# Patient Record
Sex: MALE | Race: WHITE | NOT HISPANIC OR LATINO | URBAN - NONMETROPOLITAN AREA
[De-identification: names, ages, dates, MRNs, and addresses within clinical notes are randomized per-mention and may not be internally consistent; named-entity substitution may affect disease eponyms.]

---

## 2023-01-05 ENCOUNTER — APPOINTMENT (OUTPATIENT)
Dept: GENERAL RADIOLOGY | Facility: HOSPITAL | Age: 58
End: 2023-01-05
Payer: OTHER GOVERNMENT

## 2023-01-05 ENCOUNTER — APPOINTMENT (OUTPATIENT)
Dept: CT IMAGING | Facility: HOSPITAL | Age: 58
End: 2023-01-05
Payer: OTHER GOVERNMENT

## 2023-01-05 ENCOUNTER — HOSPITAL ENCOUNTER (EMERGENCY)
Facility: HOSPITAL | Age: 58
Discharge: HOME | End: 2023-01-05
Attending: STUDENT IN AN ORGANIZED HEALTH CARE EDUCATION/TRAINING PROGRAM | Admitting: STUDENT IN AN ORGANIZED HEALTH CARE EDUCATION/TRAINING PROGRAM
Payer: OTHER GOVERNMENT

## 2023-01-05 VITALS
SYSTOLIC BLOOD PRESSURE: 143 MMHG | WEIGHT: 240 LBS | DIASTOLIC BLOOD PRESSURE: 95 MMHG | RESPIRATION RATE: 16 BRPM | HEIGHT: 75 IN | TEMPERATURE: 98.6 F | BODY MASS INDEX: 29.84 KG/M2 | OXYGEN SATURATION: 96 % | HEART RATE: 69 BPM

## 2023-01-05 DIAGNOSIS — R07.9 CHEST PAIN, UNSPECIFIED TYPE: Primary | ICD-10-CM

## 2023-01-05 LAB
ALBUMIN SERPL-MCNC: 4 G/DL (ref 3.5–5.2)
ALBUMIN/GLOB SERPL: 1.5 G/DL
ALP SERPL-CCNC: 84 U/L (ref 39–117)
ALT SERPL W P-5'-P-CCNC: 22 U/L (ref 1–41)
ANION GAP SERPL CALCULATED.3IONS-SCNC: 12 MMOL/L (ref 5–15)
AST SERPL-CCNC: 26 U/L (ref 1–40)
BASOPHILS # BLD AUTO: 0.11 10*3/MM3 (ref 0–0.2)
BASOPHILS NFR BLD AUTO: 1.4 % (ref 0–1.5)
BILIRUB SERPL-MCNC: 0.2 MG/DL (ref 0–1.2)
BUN SERPL-MCNC: 12 MG/DL (ref 6–20)
BUN/CREAT SERPL: 14.8 (ref 7–25)
CALCIUM SPEC-SCNC: 9 MG/DL (ref 8.6–10.5)
CHLORIDE SERPL-SCNC: 104 MMOL/L (ref 98–107)
CO2 SERPL-SCNC: 25 MMOL/L (ref 22–29)
CREAT SERPL-MCNC: 0.81 MG/DL (ref 0.76–1.27)
DEPRECATED RDW RBC AUTO: 41.4 FL (ref 37–54)
EGFRCR SERPLBLD CKD-EPI 2021: 103.5 ML/MIN/1.73
EOSINOPHIL # BLD AUTO: 0.37 10*3/MM3 (ref 0–0.4)
EOSINOPHIL NFR BLD AUTO: 4.7 % (ref 0.3–6.2)
ERYTHROCYTE [DISTWIDTH] IN BLOOD BY AUTOMATED COUNT: 12.6 % (ref 12.3–15.4)
GLOBULIN UR ELPH-MCNC: 2.7 GM/DL
GLUCOSE SERPL-MCNC: 109 MG/DL (ref 65–99)
HCT VFR BLD AUTO: 39.5 % (ref 37.5–51)
HGB BLD-MCNC: 12.9 G/DL (ref 13–17.7)
HOLD SPECIMEN: NORMAL
HOLD SPECIMEN: NORMAL
IMM GRANULOCYTES # BLD AUTO: 0.02 10*3/MM3 (ref 0–0.05)
IMM GRANULOCYTES NFR BLD AUTO: 0.3 % (ref 0–0.5)
LYMPHOCYTES # BLD AUTO: 2.03 10*3/MM3 (ref 0.7–3.1)
LYMPHOCYTES NFR BLD AUTO: 26 % (ref 19.6–45.3)
MCH RBC QN AUTO: 29.1 PG (ref 26.6–33)
MCHC RBC AUTO-ENTMCNC: 32.7 G/DL (ref 31.5–35.7)
MCV RBC AUTO: 89.2 FL (ref 79–97)
MONOCYTES # BLD AUTO: 0.68 10*3/MM3 (ref 0.1–0.9)
MONOCYTES NFR BLD AUTO: 8.7 % (ref 5–12)
NEUTROPHILS NFR BLD AUTO: 4.59 10*3/MM3 (ref 1.7–7)
NEUTROPHILS NFR BLD AUTO: 58.9 % (ref 42.7–76)
NRBC BLD AUTO-RTO: 0 /100 WBC (ref 0–0.2)
PLATELET # BLD AUTO: 296 10*3/MM3 (ref 140–450)
PMV BLD AUTO: 9.8 FL (ref 6–12)
POTASSIUM SERPL-SCNC: 3.5 MMOL/L (ref 3.5–5.2)
PROT SERPL-MCNC: 6.7 G/DL (ref 6–8.5)
QT INTERVAL: 406 MS
QTC INTERVAL: 462 MS
RBC # BLD AUTO: 4.43 10*6/MM3 (ref 4.14–5.8)
SODIUM SERPL-SCNC: 141 MMOL/L (ref 136–145)
TROPONIN T SERPL-MCNC: <0.01 NG/ML (ref 0–0.03)
TROPONIN T SERPL-MCNC: <0.01 NG/ML (ref 0–0.03)
WBC NRBC COR # BLD: 7.8 10*3/MM3 (ref 3.4–10.8)
WHOLE BLOOD HOLD COAG: NORMAL
WHOLE BLOOD HOLD SPECIMEN: NORMAL

## 2023-01-05 PROCEDURE — 70450 CT HEAD/BRAIN W/O DYE: CPT

## 2023-01-05 PROCEDURE — 99285 EMERGENCY DEPT VISIT HI MDM: CPT

## 2023-01-05 PROCEDURE — 93005 ELECTROCARDIOGRAM TRACING: CPT | Performed by: STUDENT IN AN ORGANIZED HEALTH CARE EDUCATION/TRAINING PROGRAM

## 2023-01-05 PROCEDURE — 84484 ASSAY OF TROPONIN QUANT: CPT | Performed by: STUDENT IN AN ORGANIZED HEALTH CARE EDUCATION/TRAINING PROGRAM

## 2023-01-05 PROCEDURE — 93010 ELECTROCARDIOGRAM REPORT: CPT | Performed by: SPECIALIST

## 2023-01-05 PROCEDURE — 71045 X-RAY EXAM CHEST 1 VIEW: CPT | Performed by: RADIOLOGY

## 2023-01-05 PROCEDURE — 85025 COMPLETE CBC W/AUTO DIFF WBC: CPT | Performed by: STUDENT IN AN ORGANIZED HEALTH CARE EDUCATION/TRAINING PROGRAM

## 2023-01-05 PROCEDURE — 71045 X-RAY EXAM CHEST 1 VIEW: CPT

## 2023-01-05 PROCEDURE — 36415 COLL VENOUS BLD VENIPUNCTURE: CPT

## 2023-01-05 PROCEDURE — 80053 COMPREHEN METABOLIC PANEL: CPT | Performed by: STUDENT IN AN ORGANIZED HEALTH CARE EDUCATION/TRAINING PROGRAM

## 2023-01-05 RX ORDER — ASPIRIN 81 MG/1
81 TABLET ORAL DAILY
Qty: 30 TABLET | Refills: 0 | Status: SHIPPED | OUTPATIENT
Start: 2023-01-05 | End: 2023-02-04

## 2023-01-05 RX ORDER — ASPIRIN 81 MG/1
324 TABLET, CHEWABLE ORAL ONCE
Status: COMPLETED | OUTPATIENT
Start: 2023-01-05 | End: 2023-01-05

## 2023-01-05 RX ORDER — SODIUM CHLORIDE 0.9 % (FLUSH) 0.9 %
10 SYRINGE (ML) INJECTION AS NEEDED
Status: DISCONTINUED | OUTPATIENT
Start: 2023-01-05 | End: 2023-01-05 | Stop reason: HOSPADM

## 2023-01-05 RX ADMIN — ASPIRIN 324 MG: 81 TABLET, CHEWABLE ORAL at 18:54

## 2023-01-05 NOTE — ED PROVIDER NOTES
Subjective   History of Present Illness  57-year-old male with past medical history of hypertension and anxiety presents to the ER with primary complaint of chest pain.  Patient noted left-sided chest pain.  Patient confirmed pain into both arms.  Patient confirmed back pain.  Patient confirmed nausea.  Patient confirmed cough.  Confirmed congestion.  Possible fever.  Patient also noted near syncope.  Denied obvious aggravating or alleviating factors.  No previous history of coronary artery disease.  Vitals stable        Review of Systems   Constitutional: Positive for fatigue.   Respiratory: Positive for cough and shortness of breath.    Cardiovascular: Positive for chest pain.   Neurological: Positive for dizziness.   All other systems reviewed and are negative.      Past Medical History:   Diagnosis Date   • Anxiety    • Erosive esophagitis    • Essential hypertension    • Low back pain        Allergies   Allergen Reactions   • Lisinopril Anaphylaxis       History reviewed. No pertinent surgical history.    History reviewed. No pertinent family history.    Social History     Socioeconomic History   • Marital status:            Objective   Physical Exam  Constitutional:       General: He is not in acute distress.     Appearance: He is well-developed. He is not ill-appearing.   HENT:      Head: Normocephalic and atraumatic.   Eyes:      Extraocular Movements: Extraocular movements intact.      Pupils: Pupils are equal, round, and reactive to light.   Neck:      Vascular: No JVD.   Cardiovascular:      Rate and Rhythm: Normal rate and regular rhythm.      Heart sounds: Normal heart sounds. No murmur heard.  Pulmonary:      Effort: No tachypnea, accessory muscle usage or respiratory distress.      Breath sounds: Normal breath sounds. No stridor. No decreased breath sounds, wheezing, rhonchi or rales.   Chest:      Chest wall: No deformity, tenderness or crepitus.   Abdominal:      General: Bowel sounds are  normal.      Palpations: Abdomen is soft.      Tenderness: There is no abdominal tenderness. There is no guarding or rebound.   Musculoskeletal:         General: Normal range of motion.      Cervical back: Normal range of motion and neck supple.      Right lower leg: No tenderness. No edema.      Left lower leg: No tenderness. No edema.   Lymphadenopathy:      Cervical: No cervical adenopathy.   Skin:     General: Skin is warm and dry.      Coloration: Skin is not cyanotic.      Findings: No ecchymosis or erythema.   Neurological:      General: No focal deficit present.      Mental Status: He is alert and oriented to person, place, and time.      Cranial Nerves: No cranial nerve deficit.      Motor: No weakness.   Psychiatric:         Mood and Affect: Mood normal. Mood is not anxious.         Behavior: Behavior normal. Behavior is not agitated.         Procedures           ED Course  ED Course as of 01/08/23 2053   Thu Jan 05, 2023   1659 EKG notes sinus rhythm.  78 bpm.  I directly evaluated the EKG of this patient and noted no acute abnormalities.    Electronically signed by Pedro Zamarripa DO, 01/05/23, 4:59 PM EST.   [SF]   2040 Patient care handed off to me pending CT head. CT head non actionable. Patient is informed to take asa daily and is provided a referral to Dr. Valdes for stress test and ECHO. Patient Heart score mild to moderate, appropriate for outpatient therapy. Patient discharged in stable condition with strict return precautions.  [LS]      ED Course User Index  [LS] Kristyn Alexander MD  [SF] Pedro Zamarripa DO                                           [facility]    Final diagnoses:   Chest pain, unspecified type       ED Disposition  ED Disposition     ED Disposition   Discharge    Condition   Stable    Comment   --             Ella Osuna, APRN  [address]  [facility] [address]  [phone]    Go in 2 days  Medical evaluation    William Valdes MD  [address] [place] [address]  [address]  [phone]    Go in 2 days  You will require stress test and ECHO please call to set up appointment         Medication List      New Prescriptions    aspirin 81 MG EC tablet  Take 1 tablet by mouth Daily for 30 days.           Where to Get Your Medications      These medications were sent to [facility]  ETHAN Perez - [address] - [phone]  - [phone]   [address], [facility] [address]    Phone: [phone]   · aspirin 81 MG EC tablet          Kristyn Alexander MD  01/08/23 2053

## 2023-01-06 NOTE — ED NOTES
Patient requesting water at this time. ED DO made aware and advises patient can have water,. Patient tolerated water well. NADN. VSS.  remains at bedside. All safety measures in place.

## 2023-01-06 NOTE — DISCHARGE INSTRUCTIONS
Please take aspirin daily and follow up with Cardiologist for stress test and ECHO. Return if chest pain worsen, difficulty breathing or any other concerns.

## 2023-02-16 ENCOUNTER — OFFICE VISIT (OUTPATIENT)
Dept: CARDIOLOGY | Facility: CLINIC | Age: 58
End: 2023-02-16
Payer: OTHER GOVERNMENT

## 2023-02-16 VITALS
HEIGHT: 75 IN | BODY MASS INDEX: 32.45 KG/M2 | SYSTOLIC BLOOD PRESSURE: 171 MMHG | OXYGEN SATURATION: 98 % | WEIGHT: 261 LBS | DIASTOLIC BLOOD PRESSURE: 101 MMHG | HEART RATE: 82 BPM

## 2023-02-16 DIAGNOSIS — R55 SYNCOPE AND COLLAPSE: ICD-10-CM

## 2023-02-16 DIAGNOSIS — R00.2 PALPITATIONS: ICD-10-CM

## 2023-02-16 DIAGNOSIS — F17.211 CIGARETTE NICOTINE DEPENDENCE IN REMISSION: ICD-10-CM

## 2023-02-16 DIAGNOSIS — R06.02 SHORTNESS OF BREATH: ICD-10-CM

## 2023-02-16 DIAGNOSIS — I10 ESSENTIAL HYPERTENSION: Primary | ICD-10-CM

## 2023-02-16 DIAGNOSIS — R07.2 PRECORDIAL CHEST PAIN: ICD-10-CM

## 2023-02-16 PROCEDURE — 93000 ELECTROCARDIOGRAM COMPLETE: CPT | Performed by: SPECIALIST

## 2023-02-16 PROCEDURE — 99204 OFFICE O/P NEW MOD 45 MIN: CPT | Performed by: SPECIALIST

## 2023-02-16 PROCEDURE — 93270 REMOTE 30 DAY ECG REV/REPORT: CPT | Performed by: SPECIALIST

## 2023-02-16 RX ORDER — HYDROCHLOROTHIAZIDE 12.5 MG/1
12.5 TABLET ORAL DAILY
COMMUNITY
End: 2023-02-16

## 2023-02-16 RX ORDER — HYDROCHLOROTHIAZIDE 25 MG/1
25 TABLET ORAL DAILY
Qty: 90 TABLET | Refills: 3 | Status: SHIPPED | OUTPATIENT
Start: 2023-02-16

## 2023-02-16 RX ORDER — AMLODIPINE BESYLATE 10 MG/1
10 TABLET ORAL DAILY
Qty: 90 TABLET | Refills: 1 | Status: SHIPPED | OUTPATIENT
Start: 2023-02-16

## 2023-02-16 RX ORDER — HYDROCHLOROTHIAZIDE 25 MG/1
25 TABLET ORAL DAILY
Qty: 90 TABLET | Refills: 3 | Status: SHIPPED | OUTPATIENT
Start: 2023-02-16 | End: 2023-02-16 | Stop reason: SDUPTHER

## 2023-02-16 RX ORDER — AMLODIPINE BESYLATE 10 MG/1
10 TABLET ORAL DAILY
Qty: 90 TABLET | Refills: 1 | Status: SHIPPED | OUTPATIENT
Start: 2023-02-16 | End: 2023-02-16 | Stop reason: SDUPTHER

## 2023-02-16 NOTE — PROGRESS NOTES
Subjective   Initial consultation, chest pain  Grey Betancourt is a 57 y.o. male who presents to day for Establish Care, Chest Pain, Shortness of Breath, and Palpitations (INTERMITTENT FLUTTERS).    CHIEF COMPLIANT  Chief Complaint   Patient presents with   • Establish Care   • Chest Pain   • Shortness of Breath   • Palpitations     INTERMITTENT FLUTTERS       Active Problems:  Problem List Items Addressed This Visit        Cardiac and Vasculature    Essential hypertension - Primary    Relevant Medications    amLODIPine (NORVASC) 10 MG tablet    hydroCHLOROthiazide (HYDRODIURIL) 25 MG tablet    Precordial chest pain    Relevant Orders    Stress Test With Myocardial Perfusion One Day    Adult Transthoracic Echo Complete w/ Color, Spectral and Contrast if necessary per protocol    Lipid Panel    Palpitations    Relevant Orders    Adult Transthoracic Echo Complete w/ Color, Spectral and Contrast if necessary per protocol    Cardiac Event Monitor       Pulmonary and Pneumonias    Shortness of breath    Relevant Orders    Adult Transthoracic Echo Complete w/ Color, Spectral and Contrast if necessary per protocol    Comprehensive Metabolic Panel       Symptoms and Signs    Syncope and collapse    Relevant Orders    Adult Transthoracic Echo Complete w/ Color, Spectral and Contrast if necessary per protocol       Tobacco    Cigarette nicotine dependence in remission       HPI  HPI  Patient has been having prolonged history of hypertension which has never been controlled as well as his chest pains relatively recently that being worse that he is having chest pain almost every day for prolonged period he will get pain he will feel that his head is also losing with a feeling of pounding in his ears couple of times he also passed out last one was about 3 months ago he was sitting the table when he passed out getting his heart sometimes fluttering a little bit and gets a sense that he wants to cough he himself smoked very briefly  when he was young.  And used long time ago not for many many years he has no history of diabetes not aware of any lipid problems has a strong family history of coronary artery disease with both the hip his parents has coronary arteries include had a heart attack when she was 50 and his brother also had heart attacks in his father at age 36 and heart  PRIOR MEDS  Current Outpatient Medications on File Prior to Visit   Medication Sig Dispense Refill   • LORATADINE-D 24HR  MG per 24 hr tablet      • Omeprazole 20 MG tablet delayed-release      • [DISCONTINUED] hydroCHLOROthiazide (HYDRODIURIL) 12.5 MG tablet Take 12.5 mg by mouth Daily.     • docusate sodium (COLACE) 100 MG capsule      • gabapentin (NEURONTIN) 800 MG tablet      • phentermine (ADIPEX-P) 37.5 MG tablet      • PROCTOZONE-HC 2.5 % rectal cream      • sucralfate (CARAFATE) 1 g tablet      • [DISCONTINUED] ibuprofen (ADVIL,MOTRIN) 400 MG tablet        No current facility-administered medications on file prior to visit.       ALLERGIES  Lisinopril    HISTORY  Past Medical History:   Diagnosis Date   • Anxiety    • Erosive esophagitis    • Essential hypertension    • Low back pain        Social History     Socioeconomic History   • Marital status:    Tobacco Use   • Smoking status: Never   • Smokeless tobacco: Never   Vaping Use   • Vaping Use: Never used   Substance and Sexual Activity   • Alcohol use: Not Currently   • Drug use: Yes     Types: Marijuana   • Sexual activity: Defer       Family History   Problem Relation Age of Onset   • Heart disease Mother    • Heart attack Mother    • Heart disease Father    • Heart attack Father    • Heart disease Brother    • Heart attack Brother        Review of Systems   Respiratory: Positive for chest tightness and shortness of breath. Negative for apnea, cough, choking, wheezing and stridor.    Cardiovascular: Positive for chest pain and palpitations. Negative for leg swelling.       Objective  "    VITALS: BP (!) 171/101   Pulse 82   Ht 190.5 cm (75\")   Wt 118 kg (261 lb)   SpO2 98%   BMI 32.62 kg/m²     LABS:   Lab Results (most recent)     None          IMAGING:   CT Head Without Contrast    Result Date: 1/5/2023  No acute intracranial abnormality. Signer Name: Jose Bird MD  Signed: 1/5/2023 7:45 PM  Workstation Name: [id number]  [facility]    XR Chest 1 View    Result Date: 1/5/2023  No radiographic evidence of acute cardiac or pulmonary disease.  This report was finalized on 1/5/2023 5:37 PM by Dr. Yong Luciano MD.        EXAM:  Physical Exam  Constitutional:       Appearance: He is well-developed.   HENT:      Head: Normocephalic and atraumatic.   Eyes:      Pupils: Pupils are equal, round, and reactive to light.   Neck:      Thyroid: No thyromegaly.      Vascular: No JVD.   Cardiovascular:      Rate and Rhythm: Normal rate and regular rhythm.      Chest Wall: PMI is not displaced.      Pulses: Normal pulses.      Heart sounds: Normal heart sounds, S1 normal and S2 normal. No murmur heard.    No friction rub. No gallop.   Pulmonary:      Effort: Pulmonary effort is normal. No respiratory distress.      Breath sounds: Normal breath sounds. No stridor. No wheezing or rales.   Chest:      Chest wall: No tenderness.   Abdominal:      General: Bowel sounds are normal. There is no distension.      Palpations: Abdomen is soft. There is no mass.      Tenderness: There is no abdominal tenderness. There is no guarding or rebound.   Musculoskeletal:      Cervical back: Neck supple. No edema.   Skin:     General: Skin is warm and dry.      Coloration: Skin is not pale.      Findings: No erythema or rash.   Neurological:      Mental Status: He is alert and oriented to person, place, and time.      Cranial Nerves: No cranial nerve deficit.      Coordination: Coordination normal.   Psychiatric:         Behavior: Behavior normal.         Procedure     ECG 12 Lead    Date/Time: 2/16/2023 " 10:11 AM  Performed by: Pietro Reza MD  Authorized by: Pietro Reza MD           EKG: Normal sinus rhythm with RSR pattern otherwise unremarkable EKG compared with EKG on 1/5/2023 no significant change       Assessment & Plan     Diagnoses and all orders for this visit:    1. Essential hypertension (Primary)  -     Discontinue: hydroCHLOROthiazide (HYDRODIURIL) 25 MG tablet; Take 1 tablet by mouth Daily.  Dispense: 90 tablet; Refill: 3  -     Discontinue: amLODIPine (NORVASC) 10 MG tablet; Take 1 tablet by mouth Daily.  Dispense: 90 tablet; Refill: 1  -     amLODIPine (NORVASC) 10 MG tablet; Take 1 tablet by mouth Daily.  Dispense: 90 tablet; Refill: 1  -     hydroCHLOROthiazide (HYDRODIURIL) 25 MG tablet; Take 1 tablet by mouth Daily.  Dispense: 90 tablet; Refill: 3    2. Precordial chest pain  -     Stress Test With Myocardial Perfusion One Day; Future  -     Adult Transthoracic Echo Complete w/ Color, Spectral and Contrast if necessary per protocol; Future  -     Lipid Panel; Future    3. Cigarette nicotine dependence in remission    4. Palpitations  -     Adult Transthoracic Echo Complete w/ Color, Spectral and Contrast if necessary per protocol; Future  -     Cardiac Event Monitor; Future    5. Shortness of breath  -     Adult Transthoracic Echo Complete w/ Color, Spectral and Contrast if necessary per protocol; Future  -     Comprehensive Metabolic Panel; Future    6. Syncope and collapse  -     Adult Transthoracic Echo Complete w/ Color, Spectral and Contrast if necessary per protocol; Future    Other orders  -     ECG 12 Lead    1.  Patient has typical atypical symptoms chest pain his visit in the ER showed negative troponins I suspect the chest pain is related to severe hypertension, but he also has multiple risk factors for coronary disease so going to proceed with treadmill nuclear stress testing for assessment of ischemia also get an echocardiogram to assess cardiac function wall motion and valve  morphology  2.  His blood pressure is very high it has been high for very long time we will going to increase the dose of the HCTZ HCTZ to 25 mg/day apparently he was having history of allergy to lisinopril with anaphylaxis so I will avoid any ACE inhibitor's at the time being and will be careful with ARB we will start him on amlodipine 10 mg/day  3.  I will repeat his potassium I will also going to get his lipid profile prior to his next visit  4.  He has palpitation he has a history of syncope also get an event monitor for assessment of arrhythmia  5.  He has remote history of brief tobacco abuse as a kid    Return After stress test.               MEDS ORDERED DURING VISIT:  New Medications Ordered This Visit   Medications   • amLODIPine (NORVASC) 10 MG tablet     Sig: Take 1 tablet by mouth Daily.     Dispense:  90 tablet     Refill:  1   • hydroCHLOROthiazide (HYDRODIURIL) 25 MG tablet     Sig: Take 1 tablet by mouth Daily.     Dispense:  90 tablet     Refill:  3       As always, Ella Osuna APRN  I appreciate very much the opportunity to participate in the cardiovascular care of your patients. Please do not hesitate to call me with any questions with regards to [facility] evaluation and management.         This document has been electronically signed by Pietro Reza MD  February 16, 2023 10:58 EST    This note is dictated utilizing voice recognition software.  Although this record has been proof read, transcriptional errors may still be present.

## 2023-02-27 ENCOUNTER — TELEPHONE (OUTPATIENT)
Dept: CARDIOLOGY | Facility: CLINIC | Age: 58
End: 2023-02-27
Payer: OTHER GOVERNMENT

## 2023-02-27 DIAGNOSIS — I48.0 AF (PAROXYSMAL ATRIAL FIBRILLATION): Primary | ICD-10-CM

## 2023-02-28 DIAGNOSIS — R00.2 PALPITATIONS: ICD-10-CM

## 2023-03-13 NOTE — TELEPHONE ENCOUNTER
Called pt no answer LM.     I am going to send a letter out to pt due to not be able to reach pt.

## 2023-03-27 ENCOUNTER — TREATMENT (OUTPATIENT)
Dept: CARDIOLOGY | Facility: CLINIC | Age: 58
End: 2023-03-27
Payer: OTHER GOVERNMENT

## 2023-03-27 DIAGNOSIS — R00.2 PALPITATIONS: Primary | ICD-10-CM

## 2023-03-27 PROCEDURE — 93272 ECG/REVIEW INTERPRET ONLY: CPT | Performed by: SPECIALIST

## 2023-04-28 ENCOUNTER — HOSPITAL ENCOUNTER (OUTPATIENT)
Dept: NUCLEAR MEDICINE | Facility: HOSPITAL | Age: 58
Discharge: HOME | End: 2023-04-28
Payer: OTHER GOVERNMENT

## 2023-04-28 ENCOUNTER — HOSPITAL ENCOUNTER (OUTPATIENT)
Dept: CARDIOLOGY | Facility: HOSPITAL | Age: 58
Discharge: HOME | End: 2023-04-28
Payer: OTHER GOVERNMENT

## 2023-04-28 DIAGNOSIS — R00.2 PALPITATIONS: ICD-10-CM

## 2023-04-28 DIAGNOSIS — R55 SYNCOPE AND COLLAPSE: ICD-10-CM

## 2023-04-28 DIAGNOSIS — R07.2 PRECORDIAL CHEST PAIN: ICD-10-CM

## 2023-04-28 DIAGNOSIS — R06.02 SHORTNESS OF BREATH: ICD-10-CM

## 2023-04-28 LAB
BH CV ECHO MEAS - ACS: 2.4 CM
BH CV ECHO MEAS - AO MAX PG: 6.3 MMHG
BH CV ECHO MEAS - AO MEAN PG: 3 MMHG
BH CV ECHO MEAS - AO ROOT DIAM: 4.1 CM
BH CV ECHO MEAS - AO V2 MAX: 125 CM/SEC
BH CV ECHO MEAS - AO V2 VTI: 25.4 CM
BH CV ECHO MEAS - EDV(CUBED): 158.8 ML
BH CV ECHO MEAS - EDV(MOD-SP4): 113 ML
BH CV ECHO MEAS - EF(MOD-SP4): 62 %
BH CV ECHO MEAS - ESV(CUBED): 54.7 ML
BH CV ECHO MEAS - ESV(MOD-SP4): 42.9 ML
BH CV ECHO MEAS - FS: 29.9 %
BH CV ECHO MEAS - IVS/LVPW: 1.16 CM
BH CV ECHO MEAS - IVSD: 1.24 CM
BH CV ECHO MEAS - LA DIMENSION: 4 CM
BH CV ECHO MEAS - LAT PEAK E' VEL: 10.4 CM/SEC
BH CV ECHO MEAS - LV DIASTOLIC VOL/BSA (35-75): 46 CM2
BH CV ECHO MEAS - LV MASS(C)D: 252.1 GRAMS
BH CV ECHO MEAS - LV SYSTOLIC VOL/BSA (12-30): 17.5 CM2
BH CV ECHO MEAS - LVIDD: 5.4 CM
BH CV ECHO MEAS - LVIDS: 3.8 CM
BH CV ECHO MEAS - LVOT AREA: 5.3 CM2
BH CV ECHO MEAS - LVOT DIAM: 2.6 CM
BH CV ECHO MEAS - LVPWD: 1.07 CM
BH CV ECHO MEAS - MED PEAK E' VEL: 8.5 CM/SEC
BH CV ECHO MEAS - MV A MAX VEL: 78 CM/SEC
BH CV ECHO MEAS - MV E MAX VEL: 63.8 CM/SEC
BH CV ECHO MEAS - MV E/A: 0.82
BH CV ECHO MEAS - PA ACC TIME: 0.12 SEC
BH CV ECHO MEAS - PA PR(ACCEL): 23.7 MMHG
BH CV ECHO MEAS - SI(MOD-SP4): 28.5 ML/M2
BH CV ECHO MEAS - SV(MOD-SP4): 70.1 ML
BH CV ECHO MEAS - TAPSE (>1.6): 3.1 CM
BH CV ECHO MEASUREMENTS AVERAGE E/E' RATIO: 6.75
BH CV NUCLEAR PRIOR STUDY: 3
BH CV REST NUCLEAR ISOTOPE DOSE: 9.5 MCI
BH CV STRESS BP STAGE 1: NORMAL
BH CV STRESS BP STAGE 2: NORMAL
BH CV STRESS BP STAGE 3: NORMAL
BH CV STRESS DURATION MIN STAGE 1: 3
BH CV STRESS DURATION MIN STAGE 2: 3
BH CV STRESS DURATION MIN STAGE 3: 3
BH CV STRESS DURATION MIN STAGE 4: 0
BH CV STRESS DURATION SEC STAGE 1: 0
BH CV STRESS DURATION SEC STAGE 2: 0
BH CV STRESS DURATION SEC STAGE 3: 0
BH CV STRESS DURATION SEC STAGE 4: 17
BH CV STRESS GRADE STAGE 1: 10
BH CV STRESS GRADE STAGE 2: 12
BH CV STRESS GRADE STAGE 3: 14
BH CV STRESS GRADE STAGE 4: 16
BH CV STRESS HR STAGE 1: 96
BH CV STRESS HR STAGE 2: 117
BH CV STRESS HR STAGE 3: 142
BH CV STRESS HR STAGE 4: 144
BH CV STRESS METS STAGE 1: 5
BH CV STRESS METS STAGE 2: 7.5
BH CV STRESS METS STAGE 3: 10
BH CV STRESS METS STAGE 4: 13.5
BH CV STRESS NUCLEAR ISOTOPE DOSE: 28.1 MCI
BH CV STRESS PROTOCOL 1: NORMAL
BH CV STRESS RECOVERY BP: NORMAL MMHG
BH CV STRESS RECOVERY HR: 95 BPM
BH CV STRESS SPEED STAGE 1: 1.7
BH CV STRESS SPEED STAGE 2: 2.5
BH CV STRESS SPEED STAGE 3: 3.4
BH CV STRESS SPEED STAGE 4: 4.2
BH CV STRESS STAGE 1: 1
BH CV STRESS STAGE 2: 2
BH CV STRESS STAGE 3: 3
BH CV STRESS STAGE 4: 4
LEFT ATRIUM VOLUME INDEX: 26.5 ML/M2
LV EF NUC BP: 60 %
MAXIMAL PREDICTED HEART RATE: 164 BPM
MAXIMAL PREDICTED HEART RATE: 164 BPM
PERCENT MAX PREDICTED HR: 87.8 %
STRESS BASELINE BP: NORMAL MMHG
STRESS BASELINE HR: 71 BPM
STRESS PERCENT HR: 103 %
STRESS POST ESTIMATED WORKLOAD: 10.9 METS
STRESS POST EXERCISE DUR MIN: 9 MIN
STRESS POST EXERCISE DUR SEC: 16 SEC
STRESS POST PEAK BP: NORMAL MMHG
STRESS POST PEAK HR: 144 BPM
STRESS TARGET HR: 139 BPM
STRESS TARGET HR: 139 BPM

## 2023-04-28 PROCEDURE — 93306 TTE W/DOPPLER COMPLETE: CPT

## 2023-04-28 PROCEDURE — 93017 CV STRESS TEST TRACING ONLY: CPT

## 2023-04-28 PROCEDURE — A9500 TC99M SESTAMIBI: HCPCS | Performed by: SPECIALIST

## 2023-04-28 PROCEDURE — 0 TECHNETIUM SESTAMIBI: Performed by: SPECIALIST

## 2023-04-28 PROCEDURE — 78452 HT MUSCLE IMAGE SPECT MULT: CPT

## 2023-04-28 PROCEDURE — 93306 TTE W/DOPPLER COMPLETE: CPT | Performed by: SPECIALIST

## 2023-04-28 RX ADMIN — TECHNETIUM TC 99M SESTAMIBI 1 DOSE: 1 INJECTION INTRAVENOUS at 09:38

## 2023-04-28 RX ADMIN — TECHNETIUM TC 99M SESTAMIBI 1 DOSE: 1 INJECTION INTRAVENOUS at 11:02

## 2023-05-15 ENCOUNTER — LAB (OUTPATIENT)
Dept: LAB | Facility: HOSPITAL | Age: 58
End: 2023-05-15
Payer: MEDICAID

## 2023-05-15 DIAGNOSIS — R07.2 PRECORDIAL CHEST PAIN: ICD-10-CM

## 2023-05-15 DIAGNOSIS — R06.02 SHORTNESS OF BREATH: ICD-10-CM

## 2023-05-15 PROCEDURE — 80061 LIPID PANEL: CPT

## 2023-05-15 PROCEDURE — 36415 COLL VENOUS BLD VENIPUNCTURE: CPT

## 2023-05-15 PROCEDURE — 80053 COMPREHEN METABOLIC PANEL: CPT

## 2023-05-16 LAB
ALBUMIN SERPL-MCNC: 4.2 G/DL (ref 3.5–5.2)
ALBUMIN/GLOB SERPL: 1.5 G/DL
ALP SERPL-CCNC: 78 U/L (ref 39–117)
ALT SERPL W P-5'-P-CCNC: 23 U/L (ref 1–41)
ANION GAP SERPL CALCULATED.3IONS-SCNC: 9 MMOL/L (ref 5–15)
AST SERPL-CCNC: 26 U/L (ref 1–40)
BILIRUB SERPL-MCNC: 0.7 MG/DL (ref 0–1.2)
BUN SERPL-MCNC: 11 MG/DL (ref 6–20)
BUN/CREAT SERPL: 14.1 (ref 7–25)
CALCIUM SPEC-SCNC: 8.7 MG/DL (ref 8.6–10.5)
CHLORIDE SERPL-SCNC: 105 MMOL/L (ref 98–107)
CHOLEST SERPL-MCNC: 173 MG/DL (ref 0–200)
CO2 SERPL-SCNC: 24 MMOL/L (ref 22–29)
CREAT SERPL-MCNC: 0.78 MG/DL (ref 0.76–1.27)
EGFRCR SERPLBLD CKD-EPI 2021: 104.7 ML/MIN/1.73
GLOBULIN UR ELPH-MCNC: 2.8 GM/DL
GLUCOSE SERPL-MCNC: 87 MG/DL (ref 65–99)
HDLC SERPL-MCNC: 52 MG/DL (ref 40–60)
LDLC SERPL CALC-MCNC: 107 MG/DL (ref 0–100)
LDLC/HDLC SERPL: 2.05 {RATIO}
POTASSIUM SERPL-SCNC: 4 MMOL/L (ref 3.5–5.2)
PROT SERPL-MCNC: 7 G/DL (ref 6–8.5)
SODIUM SERPL-SCNC: 138 MMOL/L (ref 136–145)
TRIGL SERPL-MCNC: 72 MG/DL (ref 0–150)
VLDLC SERPL-MCNC: 14 MG/DL (ref 5–40)

## 2023-06-15 ENCOUNTER — OFFICE VISIT (OUTPATIENT)
Dept: CARDIOLOGY | Facility: CLINIC | Age: 58
End: 2023-06-15
Payer: MEDICAID

## 2023-06-15 VITALS
HEART RATE: 86 BPM | RESPIRATION RATE: 18 BRPM | HEIGHT: 75 IN | BODY MASS INDEX: 33.74 KG/M2 | WEIGHT: 271.4 LBS | OXYGEN SATURATION: 98 % | SYSTOLIC BLOOD PRESSURE: 129 MMHG | DIASTOLIC BLOOD PRESSURE: 84 MMHG

## 2023-06-15 DIAGNOSIS — I10 ESSENTIAL HYPERTENSION: Primary | ICD-10-CM

## 2023-06-15 DIAGNOSIS — I48.0 AF (PAROXYSMAL ATRIAL FIBRILLATION): ICD-10-CM

## 2023-06-15 DIAGNOSIS — I47.1 ATRIAL TACHYCARDIA: ICD-10-CM

## 2023-06-15 DIAGNOSIS — R07.2 PRECORDIAL CHEST PAIN: ICD-10-CM

## 2023-06-15 DIAGNOSIS — G47.33 OBSTRUCTIVE SLEEP APNEA: ICD-10-CM

## 2023-06-15 PROBLEM — I47.19 ATRIAL TACHYCARDIA: Status: ACTIVE | Noted: 2023-06-15

## 2023-06-15 PROCEDURE — 3079F DIAST BP 80-89 MM HG: CPT | Performed by: NURSE PRACTITIONER

## 2023-06-15 PROCEDURE — 1159F MED LIST DOCD IN RCRD: CPT | Performed by: NURSE PRACTITIONER

## 2023-06-15 PROCEDURE — 1160F RVW MEDS BY RX/DR IN RCRD: CPT | Performed by: NURSE PRACTITIONER

## 2023-06-15 PROCEDURE — 3074F SYST BP LT 130 MM HG: CPT | Performed by: NURSE PRACTITIONER

## 2023-06-15 PROCEDURE — 99214 OFFICE O/P EST MOD 30 MIN: CPT | Performed by: NURSE PRACTITIONER

## 2023-06-15 RX ORDER — RANOLAZINE 500 MG/1
500 TABLET, EXTENDED RELEASE ORAL 2 TIMES DAILY
Qty: 60 TABLET | Refills: 1 | Status: SHIPPED | OUTPATIENT
Start: 2023-06-15

## 2023-06-15 RX ORDER — OMEPRAZOLE 20 MG/1
20 TABLET, DELAYED RELEASE ORAL
COMMUNITY
Start: 2023-05-31

## 2023-06-15 RX ORDER — ASPIRIN 81 MG/1
81 TABLET, FILM COATED ORAL DAILY
COMMUNITY
Start: 2023-05-31

## 2023-06-15 NOTE — PROGRESS NOTES
[facility]             Tamara NOA Harris Louisa Elizabeth, APRN  Grey Hindman  1965  06/15/2023    Patient Active Problem List   Diagnosis    Essential hypertension    Precordial chest pain    Cigarette nicotine dependence in remission    Palpitations    Shortness of breath    Syncope and collapse    Atrial tachycardia       Dear Ella Osuna APRN:    Subjective     Chief Complaint   Patient presents with    Follow-up     4 month    Results     Stress, echo and monitor    Chest Pain     Not active    Dizziness    Fatigue    Shortness of Breath       HPI:     This is a 57 y.o. male with known past medical history of essential hypertension and tobacco abuse.    Grey Betancourt presents today for routine cardiology follow up.  Patient states since his last visit he continues to have chest pain intermittently that he describes as a sharp chest pain in the left side of his chest that sometimes radiates to his arm.  Symptoms it is constant in nature and sometimes it comes and goes.  Reports intermittent palpitations with dizziness and fatigue.  Echocardiogram showed EF of 61 to 65%.  Nuclear stress testing showed very mild mostly reverse partially fixed basal inferior, extending to mid inferior wall at the rest images not involving the wall thickness with normal wall motion consistent with diaphragmatic attenuation with no ischemia seen.  Cardiac event monitor showed normal sinus rhythm with several runs of atrial tachycardia with occasional runs of atrial fibrillation with RVR.  Does have a significant family history of premature coronary artery disease in his mother, dad and brother who all  of MI at a early age.    Diagnostic Testing  Echocardiogram 2023: EF 61 to 65%  Nuclear stress test 2023: Very mild mostly reversed partly fixed basal inferior, extending to mid inferior wall at the rest images not involving the wall thickness with  normal wall motion, the defect is consistent with diaphragmatic attenuation artifact, no ischemia seen, TID 0.89.   Cardiac event monitor 2/2023: Predominant normal sinus rhythm with several runs of atrial tachycardia with occasional runs of atrial fibrillation with RVR       All other systems were reviewed and were negative.    Patient Active Problem List   Diagnosis    Essential hypertension    Precordial chest pain    Cigarette nicotine dependence in remission    Palpitations    Shortness of breath    Syncope and collapse    Atrial tachycardia       family history includes Heart attack in his brother, father, and mother; Heart disease in his brother, father, and mother.     reports that he has never smoked. He has never used smokeless tobacco. He reports that he does not currently use alcohol. He reports current drug use. Drug: Marijuana.    Allergies   Allergen Reactions    Lisinopril Anaphylaxis         Current Outpatient Medications:     Adult Aspirin Regimen 81 MG EC tablet, Take 1 tablet by mouth Daily., Disp: , Rfl:     amLODIPine (NORVASC) 10 MG tablet, Take 1 tablet by mouth Daily. (Patient taking differently: Take 5 mg by mouth Daily.), Disp: 90 tablet, Rfl: 1    hydroCHLOROthiazide (HYDRODIURIL) 25 MG tablet, Take 1 tablet by mouth Daily., Disp: 90 tablet, Rfl: 3    LORATADINE-D 24HR  MG per 24 hr tablet, , Disp: , Rfl:     Omeprazole 20 MG tablet delayed-release, , Disp: , Rfl:     omeprazole OTC (PriLOSEC OTC) 20 MG EC tablet, 1 tablet., Disp: , Rfl:     metoprolol tartrate (LOPRESSOR) 25 MG tablet, Take 1 tablet by mouth 2 (Two) Times a Day., Disp: 60 tablet, Rfl: 3    ranolazine (Ranexa) 500 MG 12 hr tablet, Take 1 tablet by mouth 2 (Two) Times a Day., Disp: 60 tablet, Rfl: 1      Physical Exam:  I have reviewed the patient's current vital signs as documented in the patient's EMR.   Vitals:    06/15/23 1352   BP: 129/84   Pulse: 86   Resp: 18   SpO2: 98%     Body mass index is 33.92 kg/m².        06/15/23  1352   Weight: 123 kg (271 lb 6.4 oz)      Physical Exam  Constitutional:       General: He is not in acute distress.     Appearance: Normal appearance. He is well-developed.   HENT:      Head: Normocephalic and atraumatic.      Mouth/Throat:      Mouth: Mucous membranes are moist.   Eyes:      Extraocular Movements: Extraocular movements intact.      Pupils: Pupils are equal, round, and reactive to light.   Neck:      Vascular: No JVD.   Cardiovascular:      Rate and Rhythm: Normal rate and regular rhythm.      Heart sounds: Normal heart sounds. No murmur heard.    No S3 or S4 sounds.   Pulmonary:      Effort: Pulmonary effort is normal. No respiratory distress.      Breath sounds: Normal breath sounds. No wheezing.   Abdominal:      General: Bowel sounds are normal. There is no distension.      Palpations: Abdomen is soft. There is no hepatomegaly.      Tenderness: There is no abdominal tenderness.   Musculoskeletal:         General: Normal range of motion.      Cervical back: Normal range of motion and neck supple.   Skin:     General: Skin is warm and dry.      Coloration: Skin is not jaundiced or pale.   Neurological:      General: No focal deficit present.      Mental Status: He is alert and oriented to person, place, and time. Mental status is at baseline.   Psychiatric:         Mood and Affect: Mood normal.         Behavior: Behavior normal.         Thought Content: Thought content normal.         Judgment: Judgment normal.          DATA REVIEWED:     TTE/AFIA:  Results for orders placed during the hospital encounter of 04/28/23    Adult Transthoracic Echo Complete w/ Color, Spectral and Contrast if necessary per protocol    Interpretation Summary    Left ventricular systolic function is normal. Left ventricular ejection fraction appears to be 61 - 65%.    Left ventricular wall thickness is consistent with borderline concentric hypertrophy.    Left ventricular diastolic function is consistent  with (grade I) impaired relaxation.    Mild dilation of the aortic root is present.      Laboratory evaluations:    Lab Results   Component Value Date    GLUCOSE 87 05/15/2023    BUN 11 05/15/2023    CREATININE 0.78 05/15/2023    BCR 14.1 05/15/2023    K 4.0 05/15/2023    CO2 24.0 05/15/2023    CALCIUM 8.7 05/15/2023    ALBUMIN 4.2 05/15/2023    AST 26 05/15/2023    ALT 23 05/15/2023     Lab Results   Component Value Date    WBC 7.80 01/05/2023    HGB 12.9 (L) 01/05/2023    HCT 39.5 01/05/2023    MCV 89.2 01/05/2023     01/05/2023     Lab Results   Component Value Date    CHOL 173 05/15/2023    TRIG 72 05/15/2023    HDL 52 05/15/2023     (H) 05/15/2023     No results found for: TSH, Y6WVRFF, N5YZKXL, THYROIDAB  No results found for: HGBA1C  Lab Results   Component Value Date    ALT 23 05/15/2023     No results found for: HGBA1C  Lab Results   Component Value Date    CREATININE 0.78 05/15/2023     No results found for: IRON, TIBC, FERRITIN  No results found for: INR, PROTIME        --------------------------------------------------------------------------------------------------------------------------    ASSESSMENT/PLAN:      Diagnosis Plan   1. Essential hypertension  Hemoglobin A1c    metoprolol tartrate (LOPRESSOR) 25 MG tablet      2. AF (paroxysmal atrial fibrillation)  metoprolol tartrate (LOPRESSOR) 25 MG tablet      3. Obstructive sleep apnea  Home Sleep Study      4. Precordial chest pain  CT Angiogram Coronary    ranolazine (Ranexa) 500 MG 12 hr tablet      5. Atrial tachycardia            Atrial fibrillation  Atrial tachycardia  Patient had cardiac event monitor which showed runs of atrial tachycardia and atrial fibrillation with RVR.  Patient with intermittent palpitations.  We did discuss atrial fibrillation in detail.  Chads Vascor of 1 for hypertension.  Check hemoglobin A1c to rule out diabetes.  For now given low [id number] or he would not require anticoagulation however did discuss  with him that if any comorbidities were to change he might require anticoagulation in the long-term.  Check home sleep study to rule out sleep apnea as cause of arrhythmias.  Add metoprolol 25 mg p.o. twice daily for arrhythmias.    Chest pain  Continues to have chest pain.  Significant history of premature coronary artery disease in his mother, father and brother.  Nuclear stress test showed possible breast attenuation artifact with no ischemia.  Did discuss with patient about obtaining CT coronary angiogram [facility] symptom versus calcium score at [facility].  Patient does have transportation issues therefore for now he states he will try to do CT coronary angiogram about itself [place] but if he is unable to find a ride he will need to do a cardiac calcium score.    Add Ranexa for chest pain.    4.  Essential hypertension   Blood pressure well controlled.  Recent CMP showed stable kidney function.  Continue with amlodipine and HCTZ.      This document has been @Electronically signed by NOA Thibodeaux, 06/15/23, 12:59 PM EDT.       Dictated Utilizing Dragon Dictation: Part of this note may be an electronic transcription/translation of spoken language to printed text using the Dragon Dictation System.    Follow-up appointment and medication changes provided in hand delivered After Visit Summary as well as reviewed in the room.      yes

## 2023-08-10 ENCOUNTER — OFFICE VISIT (OUTPATIENT)
Dept: CARDIOLOGY | Facility: CLINIC | Age: 58
End: 2023-08-10
Payer: MEDICAID

## 2023-08-10 VITALS
BODY MASS INDEX: 33.45 KG/M2 | RESPIRATION RATE: 18 BRPM | HEART RATE: 74 BPM | WEIGHT: 269 LBS | SYSTOLIC BLOOD PRESSURE: 132 MMHG | DIASTOLIC BLOOD PRESSURE: 94 MMHG | HEIGHT: 75 IN | OXYGEN SATURATION: 97 %

## 2023-08-10 DIAGNOSIS — R07.2 PRECORDIAL CHEST PAIN: ICD-10-CM

## 2023-08-10 DIAGNOSIS — I47.1 ATRIAL TACHYCARDIA: ICD-10-CM

## 2023-08-10 DIAGNOSIS — I10 ESSENTIAL HYPERTENSION: ICD-10-CM

## 2023-08-10 DIAGNOSIS — I48.0 PAROXYSMAL ATRIAL FIBRILLATION: ICD-10-CM

## 2023-08-10 DIAGNOSIS — R00.2 PALPITATIONS: Primary | ICD-10-CM

## 2023-08-10 PROCEDURE — 99214 OFFICE O/P EST MOD 30 MIN: CPT | Performed by: NURSE PRACTITIONER

## 2023-08-10 PROCEDURE — 3080F DIAST BP >= 90 MM HG: CPT | Performed by: NURSE PRACTITIONER

## 2023-08-10 PROCEDURE — 1159F MED LIST DOCD IN RCRD: CPT | Performed by: NURSE PRACTITIONER

## 2023-08-10 PROCEDURE — 1160F RVW MEDS BY RX/DR IN RCRD: CPT | Performed by: NURSE PRACTITIONER

## 2023-08-10 PROCEDURE — 3075F SYST BP GE 130 - 139MM HG: CPT | Performed by: NURSE PRACTITIONER

## 2023-08-10 RX ORDER — AMLODIPINE BESYLATE 10 MG/1
5 TABLET ORAL DAILY
Qty: 90 TABLET | Refills: 1 | Status: SHIPPED | OUTPATIENT
Start: 2023-08-10

## 2023-08-10 NOTE — PROGRESS NOTES
[facility]             Tamara NOA Harris Louisa Elizabeth, APRN  Grey Muskego  1965  08/10/2023    Patient Active Problem List   Diagnosis    Essential hypertension    Precordial chest pain    Cigarette nicotine dependence in remission    Palpitations    Shortness of breath    Syncope and collapse    Atrial tachycardia    Paroxysmal atrial fibrillation       Dear Ella Osuna APRN:    Subjective     Chief Complaint   Patient presents with    Follow-up     Pt is unable to drive and wasn't able to get CT done       HPI:     This is a 57 y.o. male with known past medical history of essential hypertension, atrial fibrillation and tobacco abuse.     Grey Betancourt presents today for routine cardiology follow up.  Patient states since his last visit he contracted poison ivy which was very severe for which she had to take steroids and other medications.  He has had lots of personal issues going on at home therefore he was unable to obtain his CT coronary angiogram.  He does have home sleep study equipment but has not performed this yet.  States he has been feeling unwell since he got poison ivy and has been dizzy and very fatigued.  Blood pressure controlled in the office today.  Patient has not been taking his medications due to everything he has had going on.  Did not obtain lab work.  Continues to have intermittent chest pain.    Diagnostic Testing  Echocardiogram 4/2023: EF 61 to 65%  Nuclear stress test 4/2023: Very mild mostly reversed partly fixed basal inferior, extending to mid inferior wall at the rest images not involving the wall thickness with normal wall motion, the defect is consistent with diaphragmatic attenuation artifact, no ischemia seen, TID 0.89.   Cardiac event monitor 2/2023: Predominant normal sinus rhythm with several runs of atrial tachycardia with occasional runs of atrial fibrillation with RVR      All other systems were  reviewed and were negative.    Patient Active Problem List   Diagnosis    Essential hypertension    Precordial chest pain    Cigarette nicotine dependence in remission    Palpitations    Shortness of breath    Syncope and collapse    Atrial tachycardia    Paroxysmal atrial fibrillation       family history includes Heart attack in his brother, father, and mother; Heart disease in his brother, father, and mother.     reports that he has never smoked. He has never used smokeless tobacco. He reports that he does not currently use alcohol. He reports current drug use. Drug: Marijuana.    Allergies   Allergen Reactions    Lisinopril Anaphylaxis         Current Outpatient Medications:     Adult Aspirin Regimen 81 MG EC tablet, Take 1 tablet by mouth Daily., Disp: , Rfl:     amLODIPine (NORVASC) 10 MG tablet, Take 0.5 tablets by mouth Daily., Disp: 90 tablet, Rfl: 1    LORATADINE-D 24HR  MG per 24 hr tablet, , Disp: , Rfl:     metoprolol tartrate (LOPRESSOR) 25 MG tablet, Take 1 tablet by mouth 2 (Two) Times a Day., Disp: 60 tablet, Rfl: 3    Omeprazole 20 MG tablet delayed-release, , Disp: , Rfl:     omeprazole OTC (PriLOSEC OTC) 20 MG EC tablet, 1 tablet., Disp: , Rfl:     ranolazine (Ranexa) 500 MG 12 hr tablet, Take 1 tablet by mouth 2 (Two) Times a Day., Disp: 60 tablet, Rfl: 1    hydroCHLOROthiazide (HYDRODIURIL) 25 MG tablet, Take 1 tablet by mouth Daily. (Patient not taking: Reported on 8/10/2023), Disp: 90 tablet, Rfl: 3      Physical Exam:  I have reviewed the patient's current vital signs as documented in the patient's EMR.   Vitals:    08/10/23 1415   BP: 132/94   Pulse: 74   Resp: 18   SpO2: 97%     Body mass index is 33.62 kg/mý.       08/10/23  1415   Weight: 122 kg (269 lb)      Physical Exam  Constitutional:       General: He is not in acute distress.     Appearance: Normal appearance. He is well-developed.   HENT:      Head: Normocephalic and atraumatic.      Mouth/Throat:      Mouth: Mucous  membranes are moist.   Eyes:      Extraocular Movements: Extraocular movements intact.      Pupils: Pupils are equal, round, and reactive to light.   Neck:      Vascular: No JVD.   Cardiovascular:      Rate and Rhythm: Normal rate and regular rhythm.      Heart sounds: Normal heart sounds. No murmur heard.    No S3 or S4 sounds.   Pulmonary:      Effort: Pulmonary effort is normal. No respiratory distress.      Breath sounds: Normal breath sounds. No wheezing.   Abdominal:      General: Bowel sounds are normal. There is no distension.      Palpations: Abdomen is soft. There is no hepatomegaly.      Tenderness: There is no abdominal tenderness.   Musculoskeletal:         General: Normal range of motion.      Cervical back: Normal range of motion and neck supple.   Skin:     General: Skin is warm and dry.      Coloration: Skin is not jaundiced or pale.   Neurological:      General: No focal deficit present.      Mental Status: He is alert and oriented to person, place, and time. Mental status is at baseline.   Psychiatric:         Mood and Affect: Mood normal.         Behavior: Behavior normal.         Thought Content: Thought content normal.         Judgment: Judgment normal.          DATA REVIEWED:     TTE/AFIA:  Results for orders placed during the hospital encounter of 04/28/23    Adult Transthoracic Echo Complete w/ Color, Spectral and Contrast if necessary per protocol    Interpretation Summary    Left ventricular systolic function is normal. Left ventricular ejection fraction appears to be 61 - 65%.    Left ventricular wall thickness is consistent with borderline concentric hypertrophy.    Left ventricular diastolic function is consistent with (grade I) impaired relaxation.    Mild dilation of the aortic root is present.      Laboratory evaluations:    Lab Results   Component Value Date    GLUCOSE 87 05/15/2023    BUN 11 05/15/2023    CREATININE 0.78 05/15/2023    BCR 14.1 05/15/2023    K 4.0 05/15/2023    CO2  24.0 05/15/2023    CALCIUM 8.7 05/15/2023    ALBUMIN 4.2 05/15/2023    AST 26 05/15/2023    ALT 23 05/15/2023     Lab Results   Component Value Date    WBC 7.80 01/05/2023    HGB 12.9 (L) 01/05/2023    HCT 39.5 01/05/2023    MCV 89.2 01/05/2023     01/05/2023     Lab Results   Component Value Date    CHOL 173 05/15/2023    TRIG 72 05/15/2023    HDL 52 05/15/2023     (H) 05/15/2023     No results found for: TSH, P5THTGL, T0UJOOV, THYROIDAB  No results found for: HGBA1C  Lab Results   Component Value Date    ALT 23 05/15/2023     No results found for: HGBA1C  Lab Results   Component Value Date    CREATININE 0.78 05/15/2023     No results found for: IRON, TIBC, FERRITIN  No results found for: INR, PROTIME        --------------------------------------------------------------------------------------------------------------------------    ASSESSMENT/PLAN:      Diagnosis Plan   1. Palpitations  TSH      2. Essential hypertension  amLODIPine (NORVASC) 10 MG tablet      3. Paroxysmal atrial fibrillation        4. Precordial chest pain        5. Atrial tachycardia            Atrial fibrillation  Atrial tachycardia  Patient had cardiac event monitor which showed runs of atrial tachycardia and atrial fibrillation with RVR.  Continues with intermittent palpitations.  Not currently on anticoagulation secondary to chads Vascor of 1 for hypertension.  Did order hemoglobin A1c at last visit to rule out diabetes however he did not have this done.  Instructed him to have lab work.  Did obtain home sleep study equipment but has not performed this yet.  I advised importance of obtaining sleep study as sleep apnea may be because of his arrhythmias.  Metoprolol 25 mg p.o. twice daily was added for his palpitations and A-fib at his last visit however he did not take this.  States he is going to do home sleep study and start taking his medications along with obtaining lab work.    Chest pain  Continues to have intermittent  chest pain.  Significant history of premature coronary artery disease in his mother, father and brother.  Did not have CT coronary angiogram due to having some personal issues.  He states he will call and have this scheduled.  Added Ranexa for chest pain however he did not take.  States he will trial this.    4.  Essential hypertension  Pressure controlled.  Advised the importance of taking his blood pressure medications as prescribed.  Continue amlodipine and HCTZ.      This document has been @Electronically signed by NOA Thibodeaux, 08/10/23, 12:42 PM EDT.       Dictated Utilizing Dragon Dictation: Part of this note may be an electronic transcription/translation of spoken language to printed text using the Dragon Dictation System.    Follow-up appointment and medication changes provided in hand delivered After Visit Summary as well as reviewed in the room.

## 2023-08-28 ENCOUNTER — LAB (OUTPATIENT)
Dept: LAB | Facility: HOSPITAL | Age: 58
End: 2023-08-28
Payer: MEDICAID

## 2023-08-28 DIAGNOSIS — R00.2 PALPITATIONS: ICD-10-CM

## 2023-08-28 DIAGNOSIS — I10 ESSENTIAL HYPERTENSION: ICD-10-CM

## 2023-08-28 PROCEDURE — 83036 HEMOGLOBIN GLYCOSYLATED A1C: CPT

## 2023-08-28 PROCEDURE — 84443 ASSAY THYROID STIM HORMONE: CPT

## 2023-08-28 PROCEDURE — 36415 COLL VENOUS BLD VENIPUNCTURE: CPT

## 2023-08-29 LAB
HBA1C MFR BLD: 5.8 % (ref 4.8–5.6)
TSH SERPL DL<=0.05 MIU/L-ACNC: 1.57 UIU/ML (ref 0.27–4.2)

## 2023-09-25 ENCOUNTER — OFFICE VISIT (OUTPATIENT)
Dept: CARDIOLOGY | Facility: CLINIC | Age: 58
End: 2023-09-25

## 2023-09-25 VITALS
DIASTOLIC BLOOD PRESSURE: 90 MMHG | OXYGEN SATURATION: 97 % | RESPIRATION RATE: 18 BRPM | WEIGHT: 269 LBS | SYSTOLIC BLOOD PRESSURE: 141 MMHG | HEIGHT: 75 IN | HEART RATE: 68 BPM | BODY MASS INDEX: 33.45 KG/M2

## 2023-09-25 DIAGNOSIS — R07.2 PRECORDIAL CHEST PAIN: ICD-10-CM

## 2023-09-25 DIAGNOSIS — G47.33 OSA (OBSTRUCTIVE SLEEP APNEA): Primary | ICD-10-CM

## 2023-09-25 DIAGNOSIS — I10 ESSENTIAL HYPERTENSION: ICD-10-CM

## 2023-09-25 DIAGNOSIS — R00.2 PALPITATIONS: ICD-10-CM

## 2023-09-25 DIAGNOSIS — I48.0 PAROXYSMAL ATRIAL FIBRILLATION: ICD-10-CM

## 2023-09-25 PROCEDURE — 93000 ELECTROCARDIOGRAM COMPLETE: CPT | Performed by: NURSE PRACTITIONER

## 2023-09-25 PROCEDURE — 3080F DIAST BP >= 90 MM HG: CPT | Performed by: NURSE PRACTITIONER

## 2023-09-25 PROCEDURE — 1159F MED LIST DOCD IN RCRD: CPT | Performed by: NURSE PRACTITIONER

## 2023-09-25 PROCEDURE — 99214 OFFICE O/P EST MOD 30 MIN: CPT | Performed by: NURSE PRACTITIONER

## 2023-09-25 PROCEDURE — 1160F RVW MEDS BY RX/DR IN RCRD: CPT | Performed by: NURSE PRACTITIONER

## 2023-09-25 PROCEDURE — 3077F SYST BP >= 140 MM HG: CPT | Performed by: NURSE PRACTITIONER

## 2023-09-25 NOTE — PROGRESS NOTES
[facility]             Tamara NOA Harris Louisa Elizabeth, APRN  Grey Betancourt  1965 09/25/2023    Patient Active Problem List   Diagnosis    Essential hypertension    Precordial chest pain    Cigarette nicotine dependence in remission    Palpitations    Shortness of breath    Syncope and collapse    Atrial tachycardia    Paroxysmal atrial fibrillation       Dear Ella Osuna APRN:    Subjective     Chief Complaint   Patient presents with    Follow-up     Routine       HPI:     This is a 58 y.o. male with known past medical history of essential hypertension, atrial fibrillation and tobacco abuse.      Grey Betancourt presents today for routine cardiology follow up.  Patient states he has been doing about the same since his last visit.  Continues to have intermittent chest pain and left arm pain.  Was unable to have CT coronary angiogram performed due to personal issues.  Did have lab work which was overall stable.  Blood pressure mildly elevated in the office today.  Did have home sleep study which showed mild sleep apnea.  He does state that he plans to move to [place] in the coming weeks and is unsure if he will stay in [place] permanently.    Diagnostic Testing  Echocardiogram 4/2023: EF 61 to 65%  Nuclear stress test 4/2023: Very mild mostly reversed partly fixed basal inferior, extending to mid inferior wall at the rest images not involving the wall thickness with normal wall motion, the defect is consistent with diaphragmatic attenuation artifact, no ischemia seen, TID 0.89.   Cardiac event monitor 2/2023: Predominant normal sinus rhythm with several runs of atrial tachycardia with occasional runs of atrial fibrillation with RVR        All other systems were reviewed and were negative.    Patient Active Problem List   Diagnosis    Essential hypertension    Precordial chest pain    Cigarette nicotine dependence in remission     Palpitations    Shortness of breath    Syncope and collapse    Atrial tachycardia    Paroxysmal atrial fibrillation       family history includes Heart attack in his brother, father, and mother; Heart disease in his brother, father, and mother.     reports that he has never smoked. He has never used smokeless tobacco. He reports that he does not currently use alcohol. He reports current drug use. Drug: Marijuana.    Allergies   Allergen Reactions    Lisinopril Anaphylaxis         Current Outpatient Medications:     Adult Aspirin Regimen 81 MG EC tablet, Take 1 tablet by mouth Daily., Disp: , Rfl:     amLODIPine (NORVASC) 10 MG tablet, Take 0.5 tablets by mouth Daily., Disp: 90 tablet, Rfl: 1    hydroCHLOROthiazide (HYDRODIURIL) 25 MG tablet, Take 1 tablet by mouth Daily., Disp: 90 tablet, Rfl: 3    LORATADINE-D 24HR  MG per 24 hr tablet, , Disp: , Rfl:     metoprolol tartrate (LOPRESSOR) 25 MG tablet, Take 1 tablet by mouth 2 (Two) Times a Day., Disp: 60 tablet, Rfl: 3    Omeprazole 20 MG tablet delayed-release, , Disp: , Rfl:     omeprazole OTC (PriLOSEC OTC) 20 MG EC tablet, 1 tablet., Disp: , Rfl:     ranolazine (Ranexa) 500 MG 12 hr tablet, Take 1 tablet by mouth 2 (Two) Times a Day., Disp: 60 tablet, Rfl: 1      Physical Exam:  I have reviewed the patient's current vital signs as documented in the patient's EMR.   Vitals:    09/25/23 1356   BP: 141/90   Pulse: 68   Resp: 18   SpO2: 97%     Body mass index is 33.62 kg/m².       09/25/23  1356   Weight: 122 kg (269 lb)      Physical Exam  Constitutional:       General: He is not in acute distress.     Appearance: Normal appearance. He is well-developed.   HENT:      Head: Normocephalic and atraumatic.      Mouth/Throat:      Mouth: Mucous membranes are moist.   Eyes:      Extraocular Movements: Extraocular movements intact.      Pupils: Pupils are equal, round, and reactive to light.   Neck:      Vascular: No JVD.   Cardiovascular:      Rate and Rhythm:  Normal rate and regular rhythm.      Heart sounds: Normal heart sounds. No murmur heard.    No S3 or S4 sounds.   Pulmonary:      Effort: Pulmonary effort is normal. No respiratory distress.      Breath sounds: Normal breath sounds. No wheezing.   Abdominal:      General: Bowel sounds are normal. There is no distension.      Palpations: Abdomen is soft. There is no hepatomegaly.      Tenderness: There is no abdominal tenderness.   Musculoskeletal:         General: Normal range of motion.      Cervical back: Normal range of motion and neck supple.   Skin:     General: Skin is warm and dry.      Coloration: Skin is not jaundiced or pale.   Neurological:      General: No focal deficit present.      Mental Status: He is alert and oriented to person, place, and time. Mental status is at baseline.   Psychiatric:         Mood and Affect: Mood normal.         Behavior: Behavior normal.         Thought Content: Thought content normal.         Judgment: Judgment normal.          DATA REVIEWED:     TTE/AFIA:  Results for orders placed during the hospital encounter of 04/28/23    Adult Transthoracic Echo Complete w/ Color, Spectral and Contrast if necessary per protocol    Interpretation Summary    Left ventricular systolic function is normal. Left ventricular ejection fraction appears to be 61 - 65%.    Left ventricular wall thickness is consistent with borderline concentric hypertrophy.    Left ventricular diastolic function is consistent with (grade I) impaired relaxation.    Mild dilation of the aortic root is present.      Laboratory evaluations:    Lab Results   Component Value Date    GLUCOSE 87 05/15/2023    BUN 11 05/15/2023    CREATININE 0.78 05/15/2023    BCR 14.1 05/15/2023    K 4.0 05/15/2023    CO2 24.0 05/15/2023    CALCIUM 8.7 05/15/2023    ALBUMIN 4.2 05/15/2023    AST 26 05/15/2023    ALT 23 05/15/2023     Lab Results   Component Value Date    WBC 7.80 01/05/2023    HGB 12.9 (L) 01/05/2023    HCT 39.5  01/05/2023    MCV 89.2 01/05/2023     01/05/2023     Lab Results   Component Value Date    CHOL 173 05/15/2023    TRIG 72 05/15/2023    HDL 52 05/15/2023     (H) 05/15/2023     Lab Results   Component Value Date    TSH 1.570 08/28/2023     Lab Results   Component Value Date    HGBA1C 5.80 (H) 08/28/2023     Lab Results   Component Value Date    ALT 23 05/15/2023     Lab Results   Component Value Date    HGBA1C 5.80 (H) 08/28/2023     Lab Results   Component Value Date    CREATININE 0.78 05/15/2023     No results found for: IRON, TIBC, FERRITIN  No results found for: INR, PROTIME          ECG 12 Lead    Date/Time: 9/25/2023 2:26 PM  Performed by: Tamara Harris APRN  Authorized by: Tamara Harris APRN   Comparison: compared with previous ECG   Rhythm: sinus rhythm  Other findings: non-specific ST-T wave changes    Clinical impression: non-specific ECG       --------------------------------------------------------------------------------------------------------------------------    ASSESSMENT/PLAN:      Diagnosis Plan   1. AIDAN (obstructive sleep apnea)  Ambulatory Referral to Pulmonology      2. Essential hypertension        3. Palpitations        4. Paroxysmal atrial fibrillation        5. Precordial chest pain            Paroxysmal atrial fibrillation  Obstructive sleep apnea  Patient has history of atrial fibrillation.  Currently in normal sinus rhythm.  Chads Vascor of 1 for hypertension.  For now given low [id number] he would not require anticoagulation however did discuss with him that if any comorbidities were to change he might require anticoagulation in the long-term.  Did have home sleep study which showed mild sleep apnea.  Refer to pulmonology for further evaluation.    Chest pain  Continues to have chest pain.  Significant history of premature coronary artery disease in his mother, father and brother.  Nuclear stress testing showed possible breast attenuation artifact  with no ischemia.  Patient did have CT coronary angiogram ordered at his last visit but was unable to have this done due to personal issues and transportation.  I did discuss with him about the importance of obtaining his testing.  He states he will call and reschedule this today.  Continue Ranexa and aspirin.    4.  Essential hypertension  Blood pressure controlled overall.  I did recommend increasing amlodipine to 10 mg or HCTZ to 50 mg today given it is in the 140s however he declines.  Recent lab work showed stable kidney function.      This document has been @Electronically signed by NOA Thibodeaux, 09/25/23, 10:24 AM EDT.       Dictated Utilizing Dragon Dictation: Part of this note may be an electronic transcription/translation of spoken language to printed text using the Dragon Dictation System.    Follow-up appointment and medication changes provided in hand delivered After Visit Summary as well as reviewed in the room.

## 2023-10-10 ENCOUNTER — OFFICE VISIT (OUTPATIENT)
Dept: PULMONOLOGY | Facility: CLINIC | Age: 58
End: 2023-10-10
Payer: COMMERCIAL

## 2023-10-10 VITALS
BODY MASS INDEX: 33.2 KG/M2 | DIASTOLIC BLOOD PRESSURE: 80 MMHG | OXYGEN SATURATION: 98 % | TEMPERATURE: 98 F | HEART RATE: 86 BPM | SYSTOLIC BLOOD PRESSURE: 140 MMHG | WEIGHT: 267 LBS | HEIGHT: 75 IN | RESPIRATION RATE: 18 BRPM

## 2023-10-10 DIAGNOSIS — E66.9 OBESITY (BMI 30-39.9): ICD-10-CM

## 2023-10-10 DIAGNOSIS — G47.33 OSA (OBSTRUCTIVE SLEEP APNEA): Primary | ICD-10-CM

## 2023-10-10 PROCEDURE — 99213 OFFICE O/P EST LOW 20 MIN: CPT | Performed by: NURSE PRACTITIONER

## 2023-10-10 NOTE — PROGRESS NOTES
"Chief Complaint  Sleep Apnea    Subjective        Grey Betancourt presents to [facility]  History of Present Illness    Mr. Betancourt is a  58 year old male with a medical history significant for anxiety, and hypertension.    He presents today for evaluation of sleep apnea.  He states that he has daytime fatigue and sleepiness.  He reports that he does snore at night and often feels unrested in the mornings.  He recently underwent a home sleep study that showed mild sleep apnea.       Objective   Vital Signs:  /80   Pulse 86   Temp 98 øF (36.7 øC) (Temporal)   Resp 18   Ht 190.5 cm (75\")   Wt 121 kg (267 lb)   SpO2 98%   BMI 33.37 kg/mý   Estimated body mass index is 33.37 kg/mý as calculated from the following:    Height as of this encounter: 190.5 cm (75\").    Weight as of this encounter: 121 kg (267 lb).       BMI is >= 30 and <35. (Class 1 Obesity). The following options were offered after discussion;: exercise counseling/recommendations and nutrition counseling/recommendations      Physical Exam     GENERAL APPEARANCE: Well developed, well nourished, alert and cooperative, and appears to be in no acute distress.    HEAD: normocephalic. Atraumatic.    EYES: PERRL, EOMI. Vision is grossly intact.    THROAT: Oral cavity and pharynx normal. No inflammation, swelling, exudate, or lesions.     NECK: Neck supple.  No thyromegaly.    CARDIAC: Normal S1 and S2. No S3, S4 or murmurs. Rhythm is regular.     RESPIRATORY:Bilateral air entry positive. Bilateral diminished breath sounds. No wheezing, crackles or rhonchi noted.    GI: Positive bowel sounds. Soft, nondistended, nontender.     MUSCULOSKELETAL: No significant deformity or joint abnormality. No edema. Peripheral pulses intact. No varicosities.    NEUROLOGICAL: Strength and sensation symmetric and intact throughout.     PSYCHIATRIC: The mental examination revealed the patient was oriented to person, place, " and time.       Result Review :  The following data was reviewed by: NOA Monreal on 10/10/2023:  Common labs          1/5/2023    17:12 5/15/2023    11:42 8/28/2023    16:50   Common Labs   Glucose 109  87     BUN 12  11     Creatinine 0.81  0.78     Sodium 141  138     Potassium 3.5  4.0     Chloride 104  105     Calcium 9.0  8.7     Albumin 4.0  4.2     Total Bilirubin 0.2  0.7     Alkaline Phosphatase 84  78     AST (SGOT) 26  26     ALT (SGPT) 22  23     WBC 7.80      Hemoglobin 12.9      Hematocrit 39.5      Platelets 296      Total Cholesterol  173     Triglycerides  72     HDL Cholesterol  52     LDL Cholesterol   107     Hemoglobin A1C   5.80                   Assessment and Plan   Diagnoses and all orders for this visit:    1. AIDAN (obstructive sleep apnea) (Primary)  -     Detailed AutoPAP Order    2. Obesity (BMI 30-39.9)          Sleep study was notable for mild sleep apnea.  AHI 5.2.  Will place order for autopap.   Patient was educated on positive airway pressure treatment.  As per CMS guidelines, more than 4 hours on 70% of observed nights is considered adherence. Patient was strongly encouraged to use CPAP as much as possible during sleep as more CPAP use is equal to more benefit. Use of heated humidification in positive airway pressure treatment to improve the adherence to the device.  In case of claustrophobia, we will provide the patient cognitive behavioral therapy and desensitization. Oral appliances use will be discussed with the patient in case of mild to moderate sleep apnea or if the patient with severe disease fail positive airway pressure treatment.       The patient was extensively educated on the consequences of untreated obstructive sleep apnea namely cardiovascular/metabolic disorder, neurocognitive deficit, daytime sleepiness, motor vehicle accidents, depression, mood disorders and reduced quality of life.  At the end of conversation, the patient voices understanding of  the disease process and treatment modality.  Patient also understands the risk of untreated obstructive sleep apnea and benefit benefits of the treatment.    Counseling time was greater than 10 minutes.    Follow Up   Return in about 2 months (around 12/10/2023).  Patient was given instructions and counseling regarding his condition or for health maintenance advice. Please see specific information pulled into the AVS if appropriate.

## 2024-01-25 ENCOUNTER — TELEPHONE (OUTPATIENT)
Dept: INFUSION THERAPY | Facility: HOSPITAL | Age: 59
End: 2024-01-25
Payer: COMMERCIAL

## 2024-01-25 NOTE — TELEPHONE ENCOUNTER
Attempted to contact patient as pre-procedure phone call prior to planned CT angiogram coronary planned for 1/29/24. Left voicemail message reminder with arrival time, nothing to eat or drink 4 hours prior to arrival time, no caffeine after midnight, okay to take medications as per normal routine on Monday,  is recommended, and if have any questions may contact outpatient prep and recovery at [phone].

## 2024-06-17 ENCOUNTER — HOSPITAL ENCOUNTER (OUTPATIENT)
Dept: GENERAL RADIOLOGY | Facility: HOSPITAL | Age: 59
Discharge: HOME | End: 2024-06-17
Admitting: NURSE PRACTITIONER
Payer: COMMERCIAL

## 2024-06-17 ENCOUNTER — TRANSCRIBE ORDERS (OUTPATIENT)
Dept: ADMINISTRATIVE | Facility: HOSPITAL | Age: 59
End: 2024-06-17
Payer: COMMERCIAL

## 2024-06-17 DIAGNOSIS — M54.50 LOW BACK PAIN, UNSPECIFIED BACK PAIN LATERALITY, UNSPECIFIED CHRONICITY, UNSPECIFIED WHETHER SCIATICA PRESENT: ICD-10-CM

## 2024-06-17 DIAGNOSIS — M54.50 LOW BACK PAIN, UNSPECIFIED BACK PAIN LATERALITY, UNSPECIFIED CHRONICITY, UNSPECIFIED WHETHER SCIATICA PRESENT: Primary | ICD-10-CM

## 2024-06-17 PROCEDURE — 72110 X-RAY EXAM L-2 SPINE 4/>VWS: CPT

## 2024-06-17 PROCEDURE — 72110 X-RAY EXAM L-2 SPINE 4/>VWS: CPT | Performed by: RADIOLOGY

## 2024-09-25 ENCOUNTER — TRANSCRIBE ORDERS (OUTPATIENT)
Dept: ADMINISTRATIVE | Facility: HOSPITAL | Age: 59
End: 2024-09-25
Payer: COMMERCIAL

## 2024-09-25 DIAGNOSIS — M54.16 LUMBAR RADICULOPATHY: Primary | ICD-10-CM

## 2024-10-10 ENCOUNTER — HOSPITAL ENCOUNTER (OUTPATIENT)
Dept: MRI IMAGING | Facility: HOSPITAL | Age: 59
Discharge: HOME | End: 2024-10-10
Admitting: PAIN MEDICINE
Payer: COMMERCIAL

## 2024-10-10 DIAGNOSIS — M54.16 LUMBAR RADICULOPATHY: ICD-10-CM

## 2024-10-10 PROCEDURE — 72148 MRI LUMBAR SPINE W/O DYE: CPT

## 2025-01-20 ENCOUNTER — LAB (OUTPATIENT)
Dept: LAB | Facility: HOSPITAL | Age: 60
End: 2025-01-20
Payer: COMMERCIAL

## 2025-01-20 ENCOUNTER — TRANSCRIBE ORDERS (OUTPATIENT)
Dept: ADMINISTRATIVE | Facility: HOSPITAL | Age: 60
End: 2025-01-20
Payer: COMMERCIAL

## 2025-01-20 DIAGNOSIS — J31.0 CHRONIC RHINITIS: Primary | ICD-10-CM

## 2025-01-20 DIAGNOSIS — T78.1XXA ALLERGIC REACTION TO ALPHA-GAL: ICD-10-CM

## 2025-01-20 DIAGNOSIS — J31.0 CHRONIC RHINITIS: ICD-10-CM

## 2025-01-20 DIAGNOSIS — T78.1XXA ADVERSE FOOD REACTION, INITIAL ENCOUNTER: ICD-10-CM

## 2025-01-20 PROCEDURE — 86003 ALLG SPEC IGE CRUDE XTRC EA: CPT

## 2025-01-20 PROCEDURE — 82785 ASSAY OF IGE: CPT

## 2025-01-20 PROCEDURE — 86008 ALLG SPEC IGE RECOMB EA: CPT

## 2025-01-20 PROCEDURE — 36415 COLL VENOUS BLD VENIPUNCTURE: CPT

## 2025-01-22 LAB
CLAM IGE QN: <0.1 KU/L
CODFISH IGE QN: <0.1 KU/L
CONV CLASS DESCRIPTION: ABNORMAL
CORN IGE QN: <0.1 KU/L
COW MILK IGE QN: 0.21 KU/L
EGG WHITE IGE QN: 0.21 KU/L
PEANUT IGE QN: <0.1 KU/L
SCALLOP IGE QN: <0.1 KU/L
SESAME SEED IGE QN: <0.1 KU/L
SHRIMP IGE QN: 0.25 KU/L
SOYBEAN IGE QN: <0.1 KU/L
WALNUT IGE QN: <0.1 KU/L
WHEAT IGE QN: <0.1 KU/L

## 2025-01-23 LAB
A ALTERNATA IGE QN: <0.1 KU/L
A FUMIGATUS IGE QN: <0.1 KU/L
ALPHA-GAL IGE QN: 1.37 KU/L
BEEF IGE QN: 0.35 KU/L
BERMUDA GRASS IGE QN: <0.1 KU/L
BOXELDER IGE QN: <0.1 KU/L
C HERBARUM IGE QN: <0.1 KU/L
CALIF WALNUT POLN IGE QN: <0.1 KU/L
CAT DANDER IGE QN: <0.1 KU/L
CMN PIGWEED IGE QN: <0.1 KU/L
COMMON RAGWEED IGE QN: 18.1 KU/L
CONV CLASS DESCRIPTION: ABNORMAL
COTTONWOOD IGE QN: <0.1 KU/L
COW MILK IGE QN: 0.2 KU/L
D FARINAE IGE QN: 0.81 KU/L
D PTERONYSS IGE QN: 1.66 KU/L
DOG DANDER IGE QN: <0.1 KU/L
IGE SERPL-ACNC: 106 IU/ML (ref 6–495)
IGE SERPL-ACNC: 111 IU/ML (ref 6–495)
LAMB IGE QN: 0.14 KU/L
LONDON PLANE IGE QN: <0.1 KU/L
MOUSE URINE PROT IGE QN: <0.1 KU/L
MT JUNIPER IGE QN: 2.79 KU/L
P NOTATUM IGE QN: <0.1 KU/L
PECAN/HICK TREE IGE QN: <0.1 KU/L
PORK IGE QN: 0.13 KU/L
ROACH IGE QN: 0.14 KU/L
SALTWORT IGE QN: <0.1 KU/L
SHEEP SORREL IGE QN: <0.1 KU/L
SILVER BIRCH IGE QN: <0.1 KU/L
TIMOTHY IGE QN: <0.1 KU/L
WHITE ASH IGE QN: <0.1 KU/L
WHITE ELM IGE QN: <0.1 KU/L
WHITE MULBERRY IGE QN: <0.1 KU/L
WHITE OAK IGE QN: <0.1 KU/L